# Patient Record
Sex: FEMALE | Race: WHITE | NOT HISPANIC OR LATINO | Employment: FULL TIME | URBAN - METROPOLITAN AREA
[De-identification: names, ages, dates, MRNs, and addresses within clinical notes are randomized per-mention and may not be internally consistent; named-entity substitution may affect disease eponyms.]

---

## 2024-10-08 ENCOUNTER — OFFICE VISIT (OUTPATIENT)
Age: 44
End: 2024-10-08
Payer: COMMERCIAL

## 2024-10-08 VITALS
DIASTOLIC BLOOD PRESSURE: 84 MMHG | OXYGEN SATURATION: 99 % | SYSTOLIC BLOOD PRESSURE: 128 MMHG | HEIGHT: 65 IN | BODY MASS INDEX: 27.03 KG/M2 | TEMPERATURE: 98.8 F | RESPIRATION RATE: 17 BRPM | WEIGHT: 162.26 LBS | HEART RATE: 85 BPM

## 2024-10-08 DIAGNOSIS — R10.10 PAIN OF UPPER ABDOMEN: Primary | ICD-10-CM

## 2024-10-08 PROCEDURE — S9083 URGENT CARE CENTER GLOBAL: HCPCS

## 2024-10-08 PROCEDURE — G0382 LEV 3 HOSP TYPE B ED VISIT: HCPCS

## 2024-10-08 RX ORDER — NORETHINDRONE ACETATE AND ETHINYL ESTRADIOL 1MG-20(21)
1 KIT ORAL DAILY
COMMUNITY
Start: 2024-05-30

## 2024-10-08 NOTE — PROGRESS NOTES
[facility]        NAME: Delia Escamilla is a 43 y.o. female  : 1980    MRN: [id number]  DATE: 2024  TIME: 3:03 PM    Assessment and Plan   Pain of upper abdomen [R10.10]  1. Pain of upper abdomen  Transfer to other facility            Patient Instructions   Proceed to the emergency department for further evaluation.    Chief Complaint     Chief Complaint   Patient presents with    Abdominal Pain     Midline upper abdominal pain - starting 1-2 hours ago. States that pain is intermittent. Denies lifestyle changes. Denies N/V/D. Denies hx of abdominal surgery. Denies urinary complaints.          History of Present Illness       Upper abdominal pain pain starting about 2 hours ago.  States that she was eating cashews and other vegetables at that time, nothing tasted funny.  States the pain is intermittent and is a stabbing pain.  Had a bowel movement after the pain started and it was normal in consistency.    Abdominal Pain  Pertinent negatives include no constipation, diarrhea, fever, nausea or vomiting.       Review of Systems   Review of Systems   Constitutional:  Negative for chills and fever.   Respiratory:  Negative for shortness of breath.    Cardiovascular:  Negative for chest pain.   Gastrointestinal:  Positive for abdominal pain. Negative for blood in stool, constipation, diarrhea, nausea and vomiting.   Musculoskeletal:  Negative for back pain, neck pain and neck stiffness.         Current Medications       Current Outpatient Medications:     norethindrone-ethinyl estradiol (JUNEL FE 1/20) 1-20 MG-MCG per tablet, Take 1 tablet by mouth daily, Disp: , Rfl:     Current Allergies     Allergies as of 10/08/2024    (No Known Allergies)            The following portions of the patient's history were reviewed and updated as appropriate: allergies, current medications, past family history, past medical history, past social history, past surgical history and problem list.     History  "reviewed. No pertinent past medical history.    History reviewed. No pertinent surgical history.    Family History   Problem Relation Age of Onset    Cancer Mother     Hypertension Mother     Hypertension Father          Medications have been verified.        Objective   /84 (BP Location: Right arm, Patient Position: Sitting, Cuff Size: Standard)   Pulse 85   Temp 98.8 °F (37.1 °C) (Tympanic)   Resp 17   Ht 5' 5\" (1.651 m)   Wt 73.6 kg (162 lb 4.1 oz)   LMP 09/10/2024 (Approximate)   SpO2 99%   BMI 27.00 kg/m²   Patient's last menstrual period was 09/10/2024 (approximate).       Physical Exam     Physical Exam  Vitals and nursing note reviewed.   Constitutional:       Appearance: Normal appearance.   HENT:      Head: Normocephalic and atraumatic.   Pulmonary:      Effort: Pulmonary effort is normal.   Abdominal:      Tenderness: There is abdominal tenderness in the right upper quadrant and epigastric area. There is guarding. There is no right CVA tenderness or left CVA tenderness. Positive signs include Miller's sign.   Skin:     General: Skin is warm and dry.      Capillary Refill: Capillary refill takes less than 2 seconds.   Neurological:      General: No focal deficit present.      Mental Status: She is alert and oriented to person, place, and time. Mental status is at baseline.      Sensory: No sensory deficit.      Motor: No weakness.   Psychiatric:         Mood and Affect: Mood normal.         Behavior: Behavior normal.         Thought Content: Thought content normal.                   "